# Patient Record
Sex: FEMALE | Race: WHITE | NOT HISPANIC OR LATINO | URBAN - METROPOLITAN AREA
[De-identification: names, ages, dates, MRNs, and addresses within clinical notes are randomized per-mention and may not be internally consistent; named-entity substitution may affect disease eponyms.]

---

## 2018-05-20 ENCOUNTER — EMERGENCY (EMERGENCY)
Facility: HOSPITAL | Age: 78
LOS: 1 days | Discharge: HOME | End: 2018-05-20
Admitting: EMERGENCY MEDICINE
Payer: COMMERCIAL

## 2018-05-20 VITALS
OXYGEN SATURATION: 95 % | SYSTOLIC BLOOD PRESSURE: 168 MMHG | DIASTOLIC BLOOD PRESSURE: 81 MMHG | RESPIRATION RATE: 18 BRPM | HEART RATE: 91 BPM | TEMPERATURE: 98 F

## 2018-05-20 VITALS — WEIGHT: 220.02 LBS

## 2018-05-20 PROCEDURE — 99053 MED SERV 10PM-8AM 24 HR FAC: CPT

## 2018-05-20 PROCEDURE — 99284 EMERGENCY DEPT VISIT MOD MDM: CPT

## 2018-05-20 PROCEDURE — 73610 X-RAY EXAM OF ANKLE: CPT | Mod: 26,LT

## 2018-05-20 PROCEDURE — 73630 X-RAY EXAM OF FOOT: CPT

## 2018-05-20 PROCEDURE — 73630 X-RAY EXAM OF FOOT: CPT | Mod: 26,LT

## 2018-05-20 PROCEDURE — 99283 EMERGENCY DEPT VISIT LOW MDM: CPT | Mod: 25

## 2018-05-20 PROCEDURE — 73610 X-RAY EXAM OF ANKLE: CPT

## 2018-05-20 RX ORDER — IBUPROFEN 200 MG
1 TABLET ORAL
Qty: 15 | Refills: 0 | OUTPATIENT
Start: 2018-05-20 | End: 2018-05-24

## 2018-05-20 RX ORDER — TRAMADOL HYDROCHLORIDE 50 MG/1
1 TABLET ORAL
Qty: 15 | Refills: 0 | OUTPATIENT
Start: 2018-05-20 | End: 2018-05-24

## 2018-05-20 RX ORDER — OXYCODONE AND ACETAMINOPHEN 5; 325 MG/1; MG/1
1 TABLET ORAL ONCE
Qty: 0 | Refills: 0 | Status: DISCONTINUED | OUTPATIENT
Start: 2018-05-20 | End: 2018-05-20

## 2018-05-20 RX ADMIN — OXYCODONE AND ACETAMINOPHEN 1 TABLET(S): 5; 325 TABLET ORAL at 05:04

## 2018-05-20 NOTE — ED ADULT NURSE NOTE - CHPI ED SYMPTOMS NEG
no chills/no weakness/no decreased eating/drinking/no numbness/no vomiting/no nausea/no fever/no tingling/no dizziness

## 2018-05-20 NOTE — ED PROVIDER NOTE - MUSCULOSKELETAL, MLM
Spine appears normal, range of motion is not limited,  left forefoot tenderness, lateral malleoli tenderness, no deformity, good distal pulses, normal sensation, normal dorsiflexion/plantar extension,

## 2018-05-20 NOTE — ED ADULT TRIAGE NOTE - ARRIVAL INFO ADDITIONAL COMMENTS
pt c/o left foot and ankle pain since friday.  no trauma.  no swelling or deformity.  pt is using both po and im pain meds from [place].

## 2018-05-20 NOTE — ED PROVIDER NOTE - OBJECTIVE STATEMENT
76 yo female , obese, with h/o HTN, DM, CAD, in the ER c/o pain to her left foot and ankle x 24 hours. Pt mentioned she twisted her foot accidentally and felt sharp pain. Pain is worse with ambulation. Pt denies any numbness or tingling to her foot, denies any recent fall, denies discoloration or swelling to her left leg,